# Patient Record
Sex: MALE | Race: WHITE | NOT HISPANIC OR LATINO | URBAN - NONMETROPOLITAN AREA
[De-identification: names, ages, dates, MRNs, and addresses within clinical notes are randomized per-mention and may not be internally consistent; named-entity substitution may affect disease eponyms.]

---

## 2019-01-29 ENCOUNTER — IMPORTED ENCOUNTER (OUTPATIENT)
Dept: URBAN - NONMETROPOLITAN AREA CLINIC 1 | Facility: CLINIC | Age: 44
End: 2019-01-29

## 2019-01-29 PROBLEM — H52.13: Noted: 2019-01-29

## 2019-01-29 PROBLEM — D31.31: Noted: 2019-01-29

## 2019-01-29 PROBLEM — H52.4: Noted: 2019-01-29

## 2019-01-29 PROCEDURE — 99204 OFFICE O/P NEW MOD 45 MIN: CPT

## 2019-01-29 NOTE — PATIENT DISCUSSION
Choroidal Nevus ODDiscussed diagnosis in detail with patient. Continue to monitor. Myopia Presbyopia OUDiscussed refractive status in detail with patient. New glasses Rx given today.

## 2022-04-09 ASSESSMENT — VISUAL ACUITY
OS_SC: 20/25
OD_SC: 20/20

## 2022-04-09 ASSESSMENT — TONOMETRY
OD_IOP_MMHG: 18
OS_IOP_MMHG: 18